# Patient Record
Sex: MALE | Race: BLACK OR AFRICAN AMERICAN
[De-identification: names, ages, dates, MRNs, and addresses within clinical notes are randomized per-mention and may not be internally consistent; named-entity substitution may affect disease eponyms.]

---

## 2017-11-10 ENCOUNTER — HOSPITAL ENCOUNTER (INPATIENT)
Dept: HOSPITAL 74 - YASAS | Age: 52
LOS: 4 days | Discharge: HOME | End: 2017-11-14
Attending: INTERNAL MEDICINE | Admitting: INTERNAL MEDICINE
Payer: COMMERCIAL

## 2017-11-10 VITALS — BODY MASS INDEX: 37.8 KG/M2

## 2017-11-10 DIAGNOSIS — F10.230: Primary | ICD-10-CM

## 2017-11-10 DIAGNOSIS — D17.0: ICD-10-CM

## 2017-11-10 DIAGNOSIS — I10: ICD-10-CM

## 2017-11-10 DIAGNOSIS — F14.20: ICD-10-CM

## 2017-11-10 LAB
APPEARANCE UR: CLEAR
BILIRUB UR STRIP.AUTO-MCNC: NEGATIVE MG/DL
COLOR UR: (no result)
KETONES UR QL STRIP: NEGATIVE
NITRITE UR QL STRIP: NEGATIVE
PH UR: 5 [PH] (ref 5–8)
PROT UR QL STRIP: NEGATIVE
PROT UR QL STRIP: NEGATIVE
RBC # UR STRIP: NEGATIVE /UL
SP GR UR: 1.02 (ref 1–1.03)
UROBILINOGEN UR STRIP-MCNC: NEGATIVE MG/DL (ref 0.2–1)

## 2017-11-10 PROCEDURE — HZ2ZZZZ DETOXIFICATION SERVICES FOR SUBSTANCE ABUSE TREATMENT: ICD-10-PCS | Performed by: INTERNAL MEDICINE

## 2017-11-10 RX ADMIN — Medication SCH: at 22:56

## 2017-11-10 RX ADMIN — TOLNAFTATE SCH APPLIC: 10 CREAM TOPICAL at 15:32

## 2017-11-10 RX ADMIN — TOLNAFTATE SCH: 10 CREAM TOPICAL at 22:56

## 2017-11-10 RX ADMIN — VALSARTAN SCH MG: 40 TABLET, FILM COATED ORAL at 15:40

## 2017-11-10 NOTE — HP
CIWA Score





- CIWA Score


Nausea/Vomitin-No Nausea/No Vomiting


Muscle Tremors: 4-Moderate,w/Arms Extend


Anxiety: 3


Agitation: 3


Paroxysmal Sweats: No Perspiration


Orientation: 0-Oriented


Tacttile Disturbances: 2-Mild Itch/Numbness/Burn


Auditory Disturbances: 0-None


Visual Disturbances: 3-Moderate Sensitivity


Headache: 0-None Present


CIWA-Ar Total Score: 15





Admission ROS BHS





- HPI


Chief Complaint: 





"I'm here for help and to get off of Alcohol and Crack Cocaine."


Pt. is here to Detox from Alcohol.


Allergies/Adverse Reactions: 


 Allergies











Allergy/AdvReac Type Severity Reaction Status Date / Time


 


No Known Allergies Allergy   Verified 11/10/17 12:32











History of Present Illness: 





Pt. is a 51 YO male here to Detox from Alcohol. Pt. has had several previous 

Detox admissions at [facility] (last: 10/2013). Longest Period of Sobriety in past: 5 

years (- ). 


Exam Limitations: No Limitations





- Ebola screening


Have you traveled outside of the country in the last 21 days: No (N)


Have you had contact with anyone from an Ebola affected area: No


Have you been sick,other than usual withdrawal symptoms: No


Do you have a fever: No





- Review of Systems


Constitutional: Malaise, Changes in sleep, Unexplained wgt Loss (Lost approx. 

10 lbs. over last 2 months.)


EENT: reports: Blurred Vision


Respiratory: reports: No Symptoms reported


Cardiac: reports: No Symptoms Reported


GI: reports: Indigestion


: reports: No Symptoms Reported


Musculoskeletal: reports: No Symptoms Reported


Integumentary: reports: No Symptoms Reported


Neuro: reports: Tremors


Endocrine: reports: No Symptoms Reported


Hematology: reports: No Symptoms Reported


Psychiatric: reports: Judgement Intact, Mood/Affect Appropiate, Orientated x3, 

Anxious


Other Systems: Reviewed and Negative





Patient History





- Patient Medical History


Hx Anemia: No


Hx Asthma: No


Hx Chronic Obstructive Pulmonary Disease (COPD): No


Hx Cancer: No


Hx Cardiac Disorders: No


Hx Congestive Heart Failure: No


Hx Hypertension: Yes


Hx Hypercholesterolemia: No


Hx Pacemaker: No


HX Cerebrovascular Accident: No


Hx Seizures: No


Hx Dementia: No


Hx Diabetes: No


Hx Gastrointestinal Disorders: No


Hx Liver Disease: No


Hx Genitourinary Disorders: No


Hx Sexually Transmitted Disorders: No


Hx Renal Disease (ESRD): No


Hx Thyroid Disease: No


Hx Human Immunodeficiency Virus (HIV): No


Hx Hepatitis C: No (Last tested: 10/2017: NEGATIVE.)


Hx Depression: No


Hx Suicide Attempt: No (PATIENT DENIES CURRENT SI / HI.)


Hx Schizophrenia: No


Other Medical History: Benign superficial growth on posterior head. Plans to 

have removed.





- Patient Surgical History


Past Surgical History: No


Hx Neurologic Surgery: No


Hx Cataract Extraction: No


Hx Cardiac Surgery: No


Hx Lung Surgery: No


Hx Breast Surgery: No


Hx Breast Biopsy: No


Hx Abdominal Surgery: No


Hx Appendectomy: No


Hx Cholecystectomy: No


Hx Genitourinary Surgery: No


Hx  Section: No


Hx Orthopedic Surgery: No


Other Surgical History: SWELLING ON BACK OF THE HEAD SINCE HE WAS 21 YRS. OLD


Anesthesia Reaction: No





- PPD History


Previous Implant?: Yes


Documented Results: Negative w/o proof


Implanted On Prior [facility] Admission?: Yes


Date: 10/13/13


Results: 0 mm


PPD to be Administered?: Yes





- Reproductive History


Patient is a Female of Child Bearing Age (11 -55 yrs old): No (PATIENT IS MALE.)





- Smoking Cessation


Smoking history: Former smoker


Have you smoked in the past 12 months: Yes


Aproximately how many cigarettes per day: 1


If you are a former smoker, when did you quit?: Approx. 2 months ago.


Cigars Per Day: 0


Hx Chewing Tobacco Use: No


Initiated information on smoking cessation: Yes


'Breaking Loose' booklet given: 11/10/17 (GIVEN ON UNIT.)





- Substance & Tx. History


Hx Alcohol Use: Yes


Hx Substance Use: Yes


Substance Use Type: Alcohol, Cocaine


Hx Substance Use Treatment: Yes (Previous Detox admissions at [facility]; Most Recent

: 10/2013.)





- Substances Abused


  ** Crack


Route: Smoking


Frequency: 3-6 times per week


Amount used: $100


Age of first use: 31


Date of Last Use: 11/10/17





  ** Alcohol-beer


Route: Oral


Frequency: Daily


Amount used: 2-3 6 pks.


Age of first use: 18


Date of Last Use: 11/10/17





Family Disease History





- Family Disease History


Family History: Denies





Admission Physical Exam [facility]





- Vital Signs


Vital Signs: 


 Vital Signs - 24 hr











  11/10/17





  11:49


 


Temperature 95.4 F L


 


Pulse Rate 89


 


Respiratory 18





Rate 


 


Blood Pressure 156/97














- Physical


General Appearance: Yes: No Apparent Distress, Nourished, Appropriately Dressed

, Tremorous


HEENTM: Yes: Hearing grossly Normal, Normocephalic, Normal Voice, LISY, Pharynx 

Normal


Respiratory: Yes: Chest Non-Tender, Lungs Clear, No Respiratory Distress, No 

Accessory Muscle Use


Neck: Yes: No masses,lesions,Nodules, Supple, Trachea in good position


Breast: Yes: Breast Exam Deferred


Cardiology: Yes: Regular Rhythm, Regular Rate, S1, S2


Abdominal: Yes: Normal Bowel Sounds, Non Tender, Soft, Protuberent


Genitourinary: Yes: Within Normal Limits


Back: Yes: Normal Inspection


Musculoskeletal: Yes: full range of Motion, Gait Steady


Extremities: Yes: Normal Range of Motion, Non-Tender, Tremors


Neurological: Yes: Fully Oriented, Alert, Normal Mood/Affect, Normal Response


Integumentary: Yes: Normal Color, Dry, Warm


Lymphatic: Yes: Within Normal Limits





- Diagnostic


(1) Alcohol dependence with uncomplicated withdrawal


Current Visit: Yes   Status: Acute   





(2) Cocaine dependence, uncomplicated


Current Visit: Yes   Status: Acute   





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 

and neck


Current Visit: Yes   Status: Chronic   





(4) Hypertension


Current Visit: Yes   Status: Chronic   


Qualifiers: 


   Hypertension type: essential hypertension   Qualified Code(s): I10 - 

Essential (primary) hypertension   





Cleared for Admission [facility]





- Detox or Rehab


[facility] Level of Care: Medically Managed


Detox Regimen/Protocol: Librium





BHS Breath Alcohol Content


Breath Alcohol Content: 0.008





Urine Drug Screen





- Results


Drug Screen Negative: No


Urine Drug Screen Results: FITZ-Cocaine

## 2017-11-11 LAB
ALBUMIN SERPL-MCNC: 3.8 G/DL (ref 3.4–5)
ALP SERPL-CCNC: 35 U/L (ref 45–117)
ALT SERPL-CCNC: 19 U/L (ref 12–78)
ANION GAP SERPL CALC-SCNC: 11 MMOL/L (ref 8–16)
AST SERPL-CCNC: 6 U/L (ref 15–37)
BILIRUB SERPL-MCNC: 0.5 MG/DL (ref 0.2–1)
CALCIUM SERPL-MCNC: 8.8 MG/DL (ref 8.5–10.1)
CO2 SERPL-SCNC: 23 MMOL/L (ref 21–32)
CREAT SERPL-MCNC: 1.3 MG/DL (ref 0.7–1.3)
DEPRECATED RDW RBC AUTO: 14.9 % (ref 11.9–15.9)
GLUCOSE SERPL-MCNC: 107 MG/DL (ref 74–106)
MCH RBC QN AUTO: 22.7 PG (ref 25.7–33.7)
MCHC RBC AUTO-ENTMCNC: 30.8 G/DL (ref 32–35.9)
MCV RBC: 73.7 FL (ref 80–96)
PLATELET # BLD AUTO: 179 K/MM3 (ref 134–434)
PMV BLD: 9.5 FL (ref 7.5–11.1)
PROT SERPL-MCNC: 7.3 G/DL (ref 6.4–8.2)
WBC # BLD AUTO: 4.3 K/MM3 (ref 4–10)

## 2017-11-11 RX ADMIN — ENALAPRIL MALEATE SCH MG: 10 TABLET ORAL at 12:31

## 2017-11-11 RX ADMIN — VALSARTAN SCH MG: 40 TABLET, FILM COATED ORAL at 12:30

## 2017-11-11 RX ADMIN — Medication SCH APPLIC: at 22:13

## 2017-11-11 RX ADMIN — Medication SCH APPLIC: at 10:31

## 2017-11-11 RX ADMIN — Medication SCH TAB: at 10:32

## 2017-11-11 RX ADMIN — TOLNAFTATE SCH APPLIC: 10 CREAM TOPICAL at 10:32

## 2017-11-11 RX ADMIN — Medication SCH MG: at 22:13

## 2017-11-11 RX ADMIN — HYDROXYZINE PAMOATE PRN MG: 50 CAPSULE ORAL at 22:14

## 2017-11-11 RX ADMIN — TOLNAFTATE SCH APPLIC: 10 CREAM TOPICAL at 22:13

## 2017-11-11 NOTE — PN
[facility] CIWA





- CIWA Score


Nausea/Vomitin-No Nausea/No Vomiting


Muscle Tremors: 5


Anxiety: 4-Mod. Anxious/Guarded


Agitation: 3


Paroxysmal Sweats: 3


Orientation: 0-Oriented


Tacttile Disturbances: 2-Mild Itch/Numbness/Burn


Auditory Disturbances: 0-None


Visual Disturbances: 1-Very Mild Sensitivity


Headache: 0-None Present


CIWA-Ar Total Score: 18





BHS Progress Note (SOAP)


Subjective: 





Tremors, Anxious, Sweating.


Objective: 


PT. A & O X 3, OBSERVED AMBULATING ON UNIT. NO ACUTE DISTRESS.


PT. DENIES CHEST PAIN.





17 15:59


 Vital Signs











Temperature  97.5 F L  17 13:09


 


Pulse Rate  98 H  17 13:09


 


Respiratory Rate  18   17 13:09


 


Blood Pressure  148/85   17 13:09


 


O2 Sat by Pulse Oximetry (%)      








 Laboratory Tests











  11/10/17 11/11/17 11/11/17





  19:00 06:20 06:20


 


WBC   4.3  D 


 


RBC   5.47  D 


 


Hgb   12.4  D 


 


Hct   40.3  D 


 


MCV   73.7 L 


 


MCH   22.7 L 


 


MCHC   30.8 L 


 


RDW   14.9 


 


Plt Count   179 


 


MPV   9.5  D 


 


Sodium    141


 


Potassium    4.2


 


Chloride    107


 


Carbon Dioxide    23


 


Anion Gap    11


 


BUN    18


 


Creatinine    1.3


 


Creat Clearance w eGFR    58.20


 


Random Glucose    107 H


 


Calcium    8.8


 


Total Bilirubin    0.5


 


AST    6 L D


 


ALT    19  D


 


Alkaline Phosphatase    35 L


 


Total Protein    7.3


 


Albumin    3.8


 


Urine Color  Ltyellow  


 


Urine Appearance  Clear  


 


Urine pH  5.0  


 


Ur Specific Gravity  1.021  


 


Urine Protein  Negative  


 


Urine Glucose (UA)  Negative  


 


Urine Ketones  Negative  


 


Urine Blood  Negative  


 


Urine Nitrite  Negative  


 


Urine Bilirubin  Negative  


 


Urine Urobilinogen  Negative  


 


Ur Leukocyte Esterase  Negative  


 


RPR Titer   














  17





  06:20


 


WBC 


 


RBC 


 


Hgb 


 


Hct 


 


MCV 


 


MCH 


 


MCHC 


 


RDW 


 


Plt Count 


 


MPV 


 


Sodium 


 


Potassium 


 


Chloride 


 


Carbon Dioxide 


 


Anion Gap 


 


BUN 


 


Creatinine 


 


Creat Clearance w eGFR 


 


Random Glucose 


 


Calcium 


 


Total Bilirubin 


 


AST 


 


ALT 


 


Alkaline Phosphatase 


 


Total Protein 


 


Albumin 


 


Urine Color 


 


Urine Appearance 


 


Urine pH 


 


Ur Specific Gravity 


 


Urine Protein 


 


Urine Glucose (UA) 


 


Urine Ketones 


 


Urine Blood 


 


Urine Nitrite 


 


Urine Bilirubin 


 


Urine Urobilinogen 


 


Ur Leukocyte Esterase 


 


RPR Titer  Nonreactive








LABS NOTED.


Assessment: 





17 15:59


WITHDRAWAL SYMPTOMS.


Plan: 





CONTINUE DETOX.


ENALAPRIL, 20 MG PO DAILY FOR ELEVATED BP (PATIENT REPORTS THAT HE TOOK THIS 

MEDICATION IN RECENT PAST PRIOR TO ADMISSION TO DETOX).

## 2017-11-11 NOTE — EKG
Test Reason : 

Blood Pressure : ***/*** mmHG

Vent. Rate : 087 BPM     Atrial Rate : 087 BPM

   P-R Int : 158 ms          QRS Dur : 084 ms

    QT Int : 342 ms       P-R-T Axes : 069 -18 -17 degrees

   QTc Int : 411 ms

 

NORMAL SINUS RHYTHM

NONSPECIFIC T WAVE ABNORMALITY

ABNORMAL ECG

NO PREVIOUS ECGS AVAILABLE

Confirmed by JOHN DONALDSON MD ([id number]) on 11/11/2017 4:39:21 PM

 

Referred By:             Confirmed By:JOHN DONALDSON MD

## 2017-11-12 RX ADMIN — Medication SCH: at 23:25

## 2017-11-12 RX ADMIN — Medication SCH TAB: at 10:30

## 2017-11-12 RX ADMIN — Medication SCH MG: at 22:28

## 2017-11-12 RX ADMIN — Medication SCH APPLIC: at 10:30

## 2017-11-12 RX ADMIN — VALSARTAN SCH MG: 40 TABLET, FILM COATED ORAL at 10:30

## 2017-11-12 RX ADMIN — TOLNAFTATE SCH APPLIC: 10 CREAM TOPICAL at 10:30

## 2017-11-12 RX ADMIN — TOLNAFTATE SCH: 10 CREAM TOPICAL at 23:25

## 2017-11-12 RX ADMIN — ENALAPRIL MALEATE SCH MG: 10 TABLET ORAL at 10:30

## 2017-11-12 RX ADMIN — HYDROXYZINE PAMOATE PRN MG: 50 CAPSULE ORAL at 22:29

## 2017-11-12 NOTE — PN
S CIWA





- CIWA Score


Nausea/Vomiting: 3


Muscle Tremors: 4-Moderate,w/Arms Extend


Anxiety: 4-Mod. Anxious/Guarded


Agitation: 2


Paroxysmal Sweats: 3


Orientation: 0-Oriented


Tacttile Disturbances: 1-Very Mild Itch/Numbness


Auditory Disturbances: 0-None


Visual Disturbances: 0-None


Headache: 1-Very Mild


CIWA-Ar Total Score: 18





BHS Progress Note (SOAP)


Subjective: 





Anxious, sweating, tremor, nausea


Objective: 





11/12/17 13:23


 Last Vital Signs











Temp Pulse Resp BP Pulse Ox


 


 97.5 F L  101 H  18   142/90    


 


 11/12/17 10:25  11/12/17 10:25  11/12/17 10:25  11/12/17 10:25   








 Laboratory Tests











  11/10/17 11/11/17 11/11/17





  19:00 06:20 06:20


 


WBC   4.3  D 


 


RBC   5.47  D 


 


Hgb   12.4  D 


 


Hct   40.3  D 


 


MCV   73.7 L 


 


MCH   22.7 L 


 


MCHC   30.8 L 


 


RDW   14.9 


 


Plt Count   179 


 


MPV   9.5  D 


 


Sodium    141


 


Potassium    4.2


 


Chloride    107


 


Carbon Dioxide    23


 


Anion Gap    11


 


BUN    18


 


Creatinine    1.3


 


Creat Clearance w eGFR    58.20


 


Random Glucose    107 H


 


Calcium    8.8


 


Total Bilirubin    0.5


 


AST    6 L D


 


ALT    19  D


 


Alkaline Phosphatase    35 L


 


Total Protein    7.3


 


Albumin    3.8


 


Urine Color  Ltyellow  


 


Urine Appearance  Clear  


 


Urine pH  5.0  


 


Ur Specific Gravity  1.021  


 


Urine Protein  Negative  


 


Urine Glucose (UA)  Negative  


 


Urine Ketones  Negative  


 


Urine Blood  Negative  


 


Urine Nitrite  Negative  


 


Urine Bilirubin  Negative  


 


Urine Urobilinogen  Negative  


 


Ur Leukocyte Esterase  Negative  


 


RPR Titer   














  11/11/17





  06:20


 


WBC 


 


RBC 


 


Hgb 


 


Hct 


 


MCV 


 


MCH 


 


MCHC 


 


RDW 


 


Plt Count 


 


MPV 


 


Sodium 


 


Potassium 


 


Chloride 


 


Carbon Dioxide 


 


Anion Gap 


 


BUN 


 


Creatinine 


 


Creat Clearance w eGFR 


 


Random Glucose 


 


Calcium 


 


Total Bilirubin 


 


AST 


 


ALT 


 


Alkaline Phosphatase 


 


Total Protein 


 


Albumin 


 


Urine Color 


 


Urine Appearance 


 


Urine pH 


 


Ur Specific Gravity 


 


Urine Protein 


 


Urine Glucose (UA) 


 


Urine Ketones 


 


Urine Blood 


 


Urine Nitrite 


 


Urine Bilirubin 


 


Urine Urobilinogen 


 


Ur Leukocyte Esterase 


 


RPR Titer  Nonreactive








Labs noted


Assessment: 





11/12/17 13:23


Withdrawal symptoms


Plan: 





Continue detox


Encouraged to drink lots of water

## 2017-11-13 RX ADMIN — Medication SCH: at 23:35

## 2017-11-13 RX ADMIN — TOLNAFTATE SCH APPLIC: 10 CREAM TOPICAL at 10:24

## 2017-11-13 RX ADMIN — TOLNAFTATE SCH: 10 CREAM TOPICAL at 23:35

## 2017-11-13 RX ADMIN — ENALAPRIL MALEATE SCH: 10 TABLET ORAL at 23:35

## 2017-11-13 RX ADMIN — Medication SCH TAB: at 10:24

## 2017-11-13 RX ADMIN — METOPROLOL TARTRATE SCH MG: 50 TABLET, FILM COATED ORAL at 10:28

## 2017-11-13 RX ADMIN — ENALAPRIL MALEATE SCH MG: 10 TABLET ORAL at 10:27

## 2017-11-13 RX ADMIN — HYDROCHLOROTHIAZIDE SCH MG: 25 TABLET ORAL at 10:27

## 2017-11-13 RX ADMIN — Medication SCH: at 10:25

## 2017-11-13 RX ADMIN — METOPROLOL TARTRATE SCH: 50 TABLET, FILM COATED ORAL at 23:35

## 2017-11-13 NOTE — PN
BHS Progress Note (SOAP)


Subjective: 





IRRITABILITY,AGITATIONS,SWEATS. PT ANXIOUS ABOUT AFTERCARE. ENCOURAGED PATIENT 

TO FOLLOW UP WITH HIS COUNSELOR ROME TODAY  TO FINALIZE AFTERCARE PLANS.


PT HAD AN EXTREME ELEVATION OFBP THIS MORNING WHILE EXHIBITING WORRIES OVER 

TOMORROW'S DISCHARGE DAY. PT IS ON ENALAPRIL 20 MG PO BID, HYDROCHLOROTHIAZIDE 

25 MG DAILY AND METOPROLOL TAR 50 MG PO BID. D/C'D PREVIOUS ADMISSION MED 

DIOVAN AND RESTARTED PT'S HOME MEDS. 


Objective: 





11/13/17 10:39


 Vital Signs











Temperature  96.6 F L  11/13/17 09:07


 


Pulse Rate  109 H  11/13/17 09:07


 


Respiratory Rate  20   11/13/17 09:07


 


Blood Pressure  180/109   11/13/17 09:07


 


O2 Sat by Pulse Oximetry (%)      








 Laboratory Last Values











WBC  4.3 K/mm3 (4.0-10.0)  D 11/11/17  06:20    


 


RBC  5.47 M/mm3 (4.00-5.60)  D 11/11/17  06:20    


 


Hgb  12.4 GM/dL (11.7-16.9)  D 11/11/17  06:20    


 


Hct  40.3 % (35.4-49)  D 11/11/17  06:20    


 


MCV  73.7 fl (80-96)  L  11/11/17  06:20    


 


MCH  22.7 pg (25.7-33.7)  L  11/11/17  06:20    


 


MCHC  30.8 g/dl (32.0-35.9)  L  11/11/17  06:20    


 


RDW  14.9 % (11.9-15.9)   11/11/17  06:20    


 


Plt Count  179 K/MM3 (134-434)   11/11/17  06:20    


 


MPV  9.5 fl (7.5-11.1)  D 11/11/17  06:20    


 


Sodium  141 mmol/L (136-145)   11/11/17  06:20    


 


Potassium  4.2 mmol/L (3.5-5.1)   11/11/17  06:20    


 


Chloride  107 mmol/L ()   11/11/17  06:20    


 


Carbon Dioxide  23 mmol/L (21-32)   11/11/17  06:20    


 


Anion Gap  11  (8-16)   11/11/17  06:20    


 


BUN  18 mg/dL (7-18)   11/11/17  06:20    


 


Creatinine  1.3 mg/dL (0.7-1.3)   11/11/17  06:20    


 


Creat Clearance w eGFR  58.20  (>60)   11/11/17  06:20    


 


Random Glucose  107 mg/dL ()  H  11/11/17  06:20    


 


Calcium  8.8 mg/dL (8.5-10.1)   11/11/17  06:20    


 


Total Bilirubin  0.5 mg/dL (0.2-1.0)   11/11/17  06:20    


 


AST  6 U/L (15-37)  L D 11/11/17  06:20    


 


ALT  19 U/L (12-78)  D 11/11/17  06:20    


 


Alkaline Phosphatase  35 U/L ()  L  11/11/17  06:20    


 


Total Protein  7.3 g/dl (6.4-8.2)   11/11/17  06:20    


 


Albumin  3.8 g/dl (3.4-5.0)   11/11/17  06:20    


 


Urine Color  Ltyellow   11/10/17  19:00    


 


Urine Appearance  Clear   11/10/17  19:00    


 


Urine pH  5.0  (5.0-8.0)   11/10/17  19:00    


 


Ur Specific Gravity  1.021  (1.001-1.035)   11/10/17  19:00    


 


Urine Protein  Negative  (NEGATIVE)   11/10/17  19:00    


 


Urine Glucose (UA)  Negative  (NEGATIVE)   11/10/17  19:00    


 


Urine Ketones  Negative  (NEGATIVE)   11/10/17  19:00    


 


Urine Blood  Negative  (NEGATIVE)   11/10/17  19:00    


 


Urine Nitrite  Negative  (NEGATIVE)   11/10/17  19:00    


 


Urine Bilirubin  Negative  (NEGATIVE)   11/10/17  19:00    


 


Urine Urobilinogen  Negative mg/dL (0.2-1.0)   11/10/17  19:00    


 


Ur Leukocyte Esterase  Negative  (NEGATIVE)   11/10/17  19:00    


 


RPR Titer  Nonreactive  (NONREACTIVE)   11/11/17  06:20    











Assessment: 





11/13/17 10:39


WITHDRAWAL SX


Plan: 





CONTINUE DETOX

## 2017-11-14 VITALS — DIASTOLIC BLOOD PRESSURE: 75 MMHG | TEMPERATURE: 98.4 F | SYSTOLIC BLOOD PRESSURE: 118 MMHG

## 2017-11-14 VITALS — HEART RATE: 86 BPM

## 2017-11-14 RX ADMIN — TOLNAFTATE SCH APPLIC: 10 CREAM TOPICAL at 10:30

## 2017-11-14 RX ADMIN — HYDROCHLOROTHIAZIDE SCH MG: 25 TABLET ORAL at 10:30

## 2017-11-14 RX ADMIN — Medication SCH: at 10:31

## 2017-11-14 RX ADMIN — ENALAPRIL MALEATE SCH MG: 10 TABLET ORAL at 10:30

## 2017-11-14 RX ADMIN — METOPROLOL TARTRATE SCH MG: 50 TABLET, FILM COATED ORAL at 10:30

## 2017-11-14 RX ADMIN — Medication SCH TAB: at 10:30

## 2017-11-14 NOTE — DS
[facility] Detox Discharge Summary


Admission Date: 


11/10/17





Discharge Date: 11/14/17





- History


Present History: Alcohol Dependence, Cocaine Dependence


Additional Comments: 





DETOX COMPLETED. ALERT O X 3. NAD. PT INSTRUCTED TO FOLLOW UP WITH PMD AT 

[facility] FOR MEDICAL MANAGEMENT OF COMORBID CONDITIONS. THIRTY DAYS RX  

SENT TO [facility] TO BE PICKED UP BY PATIENT FOR  ENALAPRIL,

HYDROCHLOROTHIAZIDE AND METOPROLOL.


Pertinent Past History: 





HTN


BENIGN LIPOMATOUS NEOPLASM OF SKIN, HEAD








- Physical Exam Results


Vital Signs: 


 Vital Signs











Temperature  98.4 F   11/14/17 09:13


 


Pulse Rate  86   11/14/17 09:13


 


Respiratory Rate  18   11/14/17 09:13


 


Blood Pressure  118/75   11/14/17 09:13


 


O2 Sat by Pulse Oximetry (%)      











Pertinent Admission Physical Exam Findings: 





WITHDRAWAL SX


 Laboratory Last Values











WBC  4.3 K/mm3 (4.0-10.0)  D 11/11/17  06:20    


 


RBC  5.47 M/mm3 (4.00-5.60)  D 11/11/17  06:20    


 


Hgb  12.4 GM/dL (11.7-16.9)  D 11/11/17  06:20    


 


Hct  40.3 % (35.4-49)  D 11/11/17  06:20    


 


MCV  73.7 fl (80-96)  L  11/11/17  06:20    


 


MCH  22.7 pg (25.7-33.7)  L  11/11/17  06:20    


 


MCHC  30.8 g/dl (32.0-35.9)  L  11/11/17  06:20    


 


RDW  14.9 % (11.9-15.9)   11/11/17  06:20    


 


Plt Count  179 K/MM3 (134-434)   11/11/17  06:20    


 


MPV  9.5 fl (7.5-11.1)  D 11/11/17  06:20    


 


Sodium  141 mmol/L (136-145)   11/11/17  06:20    


 


Potassium  4.2 mmol/L (3.5-5.1)   11/11/17  06:20    


 


Chloride  107 mmol/L ()   11/11/17  06:20    


 


Carbon Dioxide  23 mmol/L (21-32)   11/11/17  06:20    


 


Anion Gap  11  (8-16)   11/11/17  06:20    


 


BUN  18 mg/dL (7-18)   11/11/17  06:20    


 


Creatinine  1.3 mg/dL (0.7-1.3)   11/11/17  06:20    


 


Creat Clearance w eGFR  58.20  (>60)   11/11/17  06:20    


 


Random Glucose  107 mg/dL ()  H  11/11/17  06:20    


 


Calcium  8.8 mg/dL (8.5-10.1)   11/11/17  06:20    


 


Total Bilirubin  0.5 mg/dL (0.2-1.0)   11/11/17  06:20    


 


AST  6 U/L (15-37)  L D 11/11/17  06:20    


 


ALT  19 U/L (12-78)  D 11/11/17  06:20    


 


Alkaline Phosphatase  35 U/L ()  L  11/11/17  06:20    


 


Total Protein  7.3 g/dl (6.4-8.2)   11/11/17  06:20    


 


Albumin  3.8 g/dl (3.4-5.0)   11/11/17  06:20    


 


Urine Color  Ltyellow   11/10/17  19:00    


 


Urine Appearance  Clear   11/10/17  19:00    


 


Urine pH  5.0  (5.0-8.0)   11/10/17  19:00    


 


Ur Specific Gravity  1.021  (1.001-1.035)   11/10/17  19:00    


 


Urine Protein  Negative  (NEGATIVE)   11/10/17  19:00    


 


Urine Glucose (UA)  Negative  (NEGATIVE)   11/10/17  19:00    


 


Urine Ketones  Negative  (NEGATIVE)   11/10/17  19:00    


 


Urine Blood  Negative  (NEGATIVE)   11/10/17  19:00    


 


Urine Nitrite  Negative  (NEGATIVE)   11/10/17  19:00    


 


Urine Bilirubin  Negative  (NEGATIVE)   11/10/17  19:00    


 


Urine Urobilinogen  Negative mg/dL (0.2-1.0)   11/10/17  19:00    


 


Ur Leukocyte Esterase  Negative  (NEGATIVE)   11/10/17  19:00    


 


RPR Titer  Nonreactive  (NONREACTIVE)   11/11/17  06:20    














- Treatment


Hospital Course: Detox Protocol Followed, Detoxed Safely, Responded well, 

Discharged Condition Good, Rehab Referral Accepted


Patient has Accepted a Rehab Referral to: [facility]





- Medication


Discharge Medications: 


Ambulatory Orders





Enalapril Maleate [Vasotec] 20 mg PO BID #60 tablet 11/14/17 


Hydrochlorothiazide 25 mg PO DAILY #30 tablet 11/14/17 


Metoprolol Tartrate 50 mg PO BID #60 tablet 11/14/17 











- Diagnosis


(1) Alcohol dependence with uncomplicated withdrawal


Current Visit: Yes   Status: Acute   





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 

and neck


Current Visit: Yes   Status: Chronic   





(3) Cocaine dependence, uncomplicated


Current Visit: Yes   Status: Acute   





(4) Hypertension


Current Visit: Yes   Status: Chronic   


Qualifiers: 


   Hypertension type: essential hypertension   Qualified Code(s): I10 - 

Essential (primary) hypertension   





- AMA


Did Patient Leave Against Medical Advice: No

## 2018-06-11 ENCOUNTER — HOSPITAL ENCOUNTER (INPATIENT)
Dept: HOSPITAL 74 - YASAS | Age: 53
LOS: 4 days | Discharge: HOME | End: 2018-06-15
Attending: SURGERY | Admitting: SURGERY
Payer: COMMERCIAL

## 2018-06-11 VITALS — BODY MASS INDEX: 35.6 KG/M2

## 2018-06-11 DIAGNOSIS — Z87.891: ICD-10-CM

## 2018-06-11 DIAGNOSIS — G47.00: ICD-10-CM

## 2018-06-11 DIAGNOSIS — F10.230: Primary | ICD-10-CM

## 2018-06-11 DIAGNOSIS — E78.00: ICD-10-CM

## 2018-06-11 DIAGNOSIS — F14.20: ICD-10-CM

## 2018-06-11 DIAGNOSIS — D17.0: ICD-10-CM

## 2018-06-11 DIAGNOSIS — I10: ICD-10-CM

## 2018-06-11 DIAGNOSIS — F17.213: ICD-10-CM

## 2018-06-11 LAB
APPEARANCE UR: CLEAR
BACTERIA #/AREA URNS HPF: (no result) /HPF
BILIRUB UR STRIP.AUTO-MCNC: NEGATIVE MG/DL
COLOR UR: YELLOW
EPITH CASTS URNS QL MICRO: (no result) /HPF
HYALINE CASTS URNS QL MICRO: 11 /LPF
KETONES UR QL STRIP: NEGATIVE
LEUKOCYTE ESTERASE UR QL STRIP.AUTO: NEGATIVE
MUCOUS THREADS URNS QL MICRO: (no result)
NITRITE UR QL STRIP: NEGATIVE
PH UR: 5 [PH] (ref 5–8)
PROT UR QL STRIP: (no result)
PROT UR QL STRIP: NEGATIVE
RBC # UR STRIP: NEGATIVE /UL
SP GR UR: 1.03 (ref 1–1.03)
UROBILINOGEN UR STRIP-MCNC: NEGATIVE MG/DL (ref 0.2–1)

## 2018-06-11 PROCEDURE — HZ2ZZZZ DETOXIFICATION SERVICES FOR SUBSTANCE ABUSE TREATMENT: ICD-10-PCS | Performed by: SURGERY

## 2018-06-11 RX ADMIN — ATORVASTATIN CALCIUM SCH MG: 10 TABLET, FILM COATED ORAL at 22:17

## 2018-06-11 RX ADMIN — ZOLPIDEM TARTRATE PRN MG: 10 TABLET, FILM COATED ORAL at 22:17

## 2018-06-11 RX ADMIN — Medication SCH MG: at 22:17

## 2018-06-11 RX ADMIN — ENALAPRIL MALEATE SCH MG: 10 TABLET ORAL at 17:43

## 2018-06-11 RX ADMIN — METOPROLOL TARTRATE SCH MG: 50 TABLET, FILM COATED ORAL at 17:42

## 2018-06-11 NOTE — HP
CIWA Score





- CIWA Score


Nausea/Vomiting: 3


Muscle Tremors: 3


Anxiety: 3


Agitation: 3


Paroxysmal Sweats: 1-Minimal Palms Moist


Orientation: 0-Oriented


Tacttile Disturbances: 2-Mild Itch/Numbness/Burn


Auditory Disturbances: 2-Mild Harshness/Frighten


Visual Disturbances: 1-Very Mild Sensitivity


Headache: 2-Mild


CIWA-Ar Total Score: 20





Admission ROS [facility]





- HPI


Chief Complaint: 





i need help to stop drinking alcohol and cocaine


Allergies/Adverse Reactions: 


 Allergies











Allergy/AdvReac Type Severity Reaction Status Date / Time


 


No Known Allergies Allergy   Verified 18 14:13











History of Present Illness: 





this 53 years old male with alcohol and cocaine dependence,seeking detox,

withdrawal symptom,last detox [facility] 11/10/17 to 17


history of hypertension


insomnia


longest period of sobriety 5 years





- Ebola screening


Have you traveled outside of the country in the last 21 days: No (N)


Have you had contact with anyone from an Ebola affected area: No


Have you been sick,other than usual withdrawal symptoms: No


Do you have a fever: No





- Review of Systems


Constitutional: Chills, Loss of Appetite, Malaise, Night Sweats, Changes in 

sleep, Weakness


EENT: reports: Nose Congestion, Other (lipoma of occipital area)


Respiratory: reports: No Symptoms reported


Cardiac: reports: No Symptoms Reported


GI: reports: Diarrhea, Nausea, Vomiting, Abdominal cramping


: reports: No Symptoms Reported


Musculoskeletal: reports: Back Pain, Muscle Pain


Integumentary: reports: Dryness


Neuro: reports: Headache, Tremors


Endocrine: reports: No Symptoms Reported


Hematology: reports: No Symptoms Reported


Psychiatric: reports: No Sypmtoms Reported, Judgement Intact, Mood/Affect 

Appropiate, Orientated x3 (insomnia)





Patient History





- Patient Medical History


Hx Anemia: No


Hx Asthma: No


Hx Chronic Obstructive Pulmonary Disease (COPD): No


Hx Cancer: No


Hx Cardiac Disorders: No


Hx Congestive Heart Failure: No


Hx Hypertension: Yes (on med)


Hx Hypercholesterolemia: No


Hx Pacemaker: No


HX Cerebrovascular Accident: No


Hx Seizures: No


Hx Dementia: No


Hx Diabetes: No


Hx Gastrointestinal Disorders: No


Hx Liver Disease: No


Hx Genitourinary Disorders: No


Hx Sexually Transmitted Disorders: No


Hx Renal Disease (ESRD): No


Hx Thyroid Disease: No


Hx Human Immunodeficiency Virus (HIV): No (last  negative)


Hx Hepatitis C: No (Last tested: 10/2017: NEGATIVE.)


Hx Depression: No


Hx Suicide Attempt: No (PATIENT DENIES CURRENT SI / HI.)


Hx Bipolar Disorder: No


Hx Schizophrenia: No


Other Medical History: no suicidal,no homicidal





- Patient Surgical History


Past Surgical History: No


Hx Neurologic Surgery: No


Hx Cataract Extraction: No


Hx Cardiac Surgery: No


Hx Lung Surgery: No


Hx Breast Surgery: No


Hx Breast Biopsy: No


Hx Abdominal Surgery: No


Hx Appendectomy: No


Hx Cholecystectomy: No


Hx Genitourinary Surgery: No


Hx  Section: No


Hx Orthopedic Surgery: No


Other Surgical History: SWELLING ON BACK OF THE HEAD SINCE HE WAS 21 YRS. OLD


Anesthesia Reaction: No





- PPD History


Previous Implant?: Yes


Implanted On Prior [facility] Admission?: Yes


Date: 17


Results: 0 mm


PPD to be Administered?: No





- Smoking Cessation


Smoking history: Former smoker


Have you smoked in the past 12 months: Yes


Aproximately how many cigarettes per day: 1


If you are a former smoker, when did you quit?: Approx. 2 months ago.


Cigars Per Day: 0


Hx Chewing Tobacco Use: No


Initiated information on smoking cessation: Yes


'Breaking Loose' booklet given: 18





- Substance & Tx. History


Hx Alcohol Use: Yes


Hx Substance Use: Yes


Substance Use Type: Alcohol, Cocaine


Hx Substance Use Treatment: Yes ([facility] 11/10/17to 17)





- Substances Abused


  ** Alcohol


Route: Oral


Frequency: Daily


Amount used: 4-5 40 Oz BEERS


Age of first use: 18


Date of Last Use: 18





  ** Cocaine


Route: Smoking


Frequency: Daily


Amount used: $100 AND UP


Age of first use: 31


Date of Last Use: 18





Family Disease History





- Family Disease History


Family History: Denies





Admission Physical Exam [facility]





- Vital Signs


Vital Signs: 


 Vital Signs - 24 hr











  18





  13:56


 


Temperature 98.3 F


 


Pulse Rate 96 H


 


Respiratory 16





Rate 


 


Blood Pressure 156/96














- Physical


General Appearance: Yes: Moderate Distress, Tremorous, Irritable, Sweating, 

Anxious, Other (mass occipital area 4x4 cm)


HEENTM: Yes: Normal ENT Inspection, LISY, Pharynx Normal


Respiratory: Yes: Lungs Clear, Normal Breath Sounds, No Respiratory Distress


Neck: Yes: Within Normal Limits


Breast: Yes: Within Normal Limits


Cardiology: Yes: Within Normal Limits, Regular Rhythm, Regular Rate, S1, S2


Abdominal: Yes: Within Normal Limits, Normal Bowel Sounds, Non Tender, Flat, 

Soft


Genitourinary: Yes: Within Normal Limits


Back: Yes: Within Normal Limits


Musculoskeletal: Yes: Within Normal Limits, Back pain, Muscle Pain


Extremities: Yes: Tremors


Neurological: Yes: CNs II-XII NML intact, Alert, Motor Strength 5/5


Integumentary: Yes: Dry





- Diagnostic


(1) Alcohol dependence with uncomplicated withdrawal


Current Visit: No   Status: Acute   





(2) Cocaine dependence, uncomplicated


Current Visit: No   Status: Acute   





(3) Hypertension


Current Visit: No   Status: Chronic   


Qualifiers: 


   Hypertension type: essential hypertension   Qualified Code(s): I10 - 

Essential (primary) hypertension   





(4) Lipoma


Current Visit: Yes   Status: Acute   





Cleared for Admission S





- Detox or Rehab


[facility] Level of Care: Medically Managed


Detox Regimen/Protocol: Librium





BHS Breath Alcohol Content


Breath Alcohol Content: 0





Urine Drug Screen





- Results


Drug Screen Negative: No


Urine Drug Screen Results: FITZ-Cocaine

## 2018-06-11 NOTE — CONSULT
[facility]HS Psychiatric Consult





- Data


Date of interview: 06/11/18


Admission source: [facility]


Identifying data: Readmission to [facility] for this 52 y/o AA male seeking 

detox treatment on [address] for alcohol and cocaine (crack) dependence.Patient 

is ,a father of four,domiciled and employed.


Substance Abuse History: Confirmed by the patient in my interview.Smoking 

history: Former smoker.  Have you smoked in the past 12 months: Yes.  

Aproximately how many cigarettes per day: 1.  If you are a former smoker, when 

did you quit?: Approx. 2 months ago.  Cigars Per Day: 0.  Hx Chewing Tobacco Use

: No.  Initiated information on smoking cessation: Yes.  'Breaking Loose' 

booklet given: 06/11/18.  - Substance & Tx. History.  Hx Alcohol Use: Yes.  Hx 

Substance Use: Yes.  Substance Use Type: Alcohol, Cocaine.  Hx Substance Use 

Treatment: Yes ([facility] 11/10/17to 11/14/17).  - Substances Abused.  ** Alcohol.  

Route: Oral.  Frequency: Daily.  Amount used: 4-5 40 Oz BEERS.  Age of first use

: 18.  Date of Last Use: 06/11/18.  ** Cocaine.  Route: Smoking.  Frequency: 

Daily.  Amount used: $100 AND UP.  Age of first use: 31.  Date of Last Use: 06/ 11/18


Medical History: Hypertension and lipoma (occipital region).No known allergies.


Psychiatric History: Patient denies.


Physical/Sexual Abuse/Trauma History: Patient denies.


Additional Comment: Urine Drug Screen Results: FITZ-Cocaine.Noted.





Mental Status Exam





- Mental Status Exam


Alert and Oriented to: Time, Place, Person


Cognitive Function: Good


Patient Appearance: Well Groomed (presence of a soft mass protruding in the 

back of the head : occipital area ; steady growth for several years)


Mood: Hopeful, Euthymic


Affect: Appropriate, Normal Range


Patient Behavior: Fatigued, Appropriate, Cooperative


Speech Pattern: Clear, Appropriate


Voice Loudness: Normal


Thought Process: Intact, Goal Oriented


Thought Disorder: Not Present


Hallucinations: Denies


Suicidal Ideation: Denies


Homicidal Ideation: Denies


Insight/Judgement: Fair


Sleep: Poorly, Difficulty falling asleep


Appetite: Good


Muscle strength/Tone: Normal


Gait/Station: Normal





Psychiatric Findings





- Problem List (Axis 1, 2,3)


(1) Alcohol dependence with uncomplicated withdrawal


Current Visit: Yes   Status: Acute   





(2) Cocaine dependence, uncomplicated


Current Visit: Yes   Status: Acute   





(3) Nicotine dependence


Current Visit: Yes   Status: Acute   





(4) Insomnia


Current Visit: Yes   Status: Acute   





- Initial Treatment Plan


Initial Treatment Plan: Psychoeducation.Sleep hygiene.Detoxification in 

progress.Ambien 10 mg po hs prn.Patient is made aware of the risk for 

parasomnias.Mr Tom agrees to this careplan.Observation.

## 2018-06-12 LAB
ALBUMIN SERPL-MCNC: 3.8 G/DL (ref 3.4–5)
ALP SERPL-CCNC: 38 U/L (ref 45–117)
ALT SERPL-CCNC: 27 U/L (ref 12–78)
ANION GAP SERPL CALC-SCNC: 10 MMOL/L (ref 8–16)
AST SERPL-CCNC: 14 U/L (ref 15–37)
BILIRUB SERPL-MCNC: 0.3 MG/DL (ref 0.2–1)
BUN SERPL-MCNC: 23 MG/DL (ref 7–18)
CALCIUM SERPL-MCNC: 8.5 MG/DL (ref 8.5–10.1)
CHLORIDE SERPL-SCNC: 106 MMOL/L (ref 98–107)
CO2 SERPL-SCNC: 24 MMOL/L (ref 21–32)
CREAT SERPL-MCNC: 1.2 MG/DL (ref 0.7–1.3)
DEPRECATED RDW RBC AUTO: 14.7 % (ref 11.9–15.9)
GLUCOSE SERPL-MCNC: 127 MG/DL (ref 74–106)
HCT VFR BLD CALC: 40.3 % (ref 35.4–49)
HGB BLD-MCNC: 12.9 GM/DL (ref 11.7–16.9)
MCH RBC QN AUTO: 24 PG (ref 25.7–33.7)
MCHC RBC AUTO-ENTMCNC: 32.1 G/DL (ref 32–35.9)
MCV RBC: 74.8 FL (ref 80–96)
PLATELET # BLD AUTO: 173 K/MM3 (ref 134–434)
PMV BLD: 10.4 FL (ref 7.5–11.1)
POTASSIUM SERPLBLD-SCNC: 4.2 MMOL/L (ref 3.5–5.1)
PROT SERPL-MCNC: 7.6 G/DL (ref 6.4–8.2)
RBC # BLD AUTO: 5.38 M/MM3 (ref 4–5.6)
SODIUM SERPL-SCNC: 140 MMOL/L (ref 136–145)
WBC # BLD AUTO: 4.3 K/MM3 (ref 4–10)

## 2018-06-12 RX ADMIN — Medication SCH MG: at 22:08

## 2018-06-12 RX ADMIN — METOPROLOL TARTRATE SCH MG: 50 TABLET, FILM COATED ORAL at 17:30

## 2018-06-12 RX ADMIN — Medication SCH TAB: at 10:42

## 2018-06-12 RX ADMIN — ENALAPRIL MALEATE SCH MG: 10 TABLET ORAL at 17:31

## 2018-06-12 RX ADMIN — METOPROLOL TARTRATE SCH MG: 50 TABLET, FILM COATED ORAL at 10:42

## 2018-06-12 RX ADMIN — ENALAPRIL MALEATE SCH MG: 10 TABLET ORAL at 10:42

## 2018-06-12 RX ADMIN — ATORVASTATIN CALCIUM SCH MG: 10 TABLET, FILM COATED ORAL at 22:09

## 2018-06-12 RX ADMIN — ZOLPIDEM TARTRATE PRN MG: 10 TABLET, FILM COATED ORAL at 22:09

## 2018-06-12 RX ADMIN — HYDROCHLOROTHIAZIDE SCH MG: 25 TABLET ORAL at 10:42

## 2018-06-12 NOTE — PN
S CIWA





- CIWA Score


Nausea/Vomitin-No Nausea/No Vomiting


Muscle Tremors: 4-Moderate,w/Arms Extend


Anxiety: 4-Mod. Anxious/Guarded


Agitation: 4-Moderately Restless


Paroxysmal Sweats: 1-Minimal Palms Moist


Orientation: 0-Oriented


Tacttile Disturbances: 0-None


Auditory Disturbances: 0-None


Visual Disturbances: 0-None


Headache: 0-None Present


CIWA-Ar Total Score: 13





BHS Progress Note (SOAP)


Subjective: 





ANXIETY,SWEATS. ALERT O X 3. OOB AMBULATING WITH STEADY GAIT.


Objective: 





18 10:16


 Vital Signs











  18





  03:30 06:18 09:20


 


Temperature  97.4 F L 97.4 F L


 


Pulse Rate  77 82


 


Respiratory 18 20 20





Rate   


 


Blood Pressure  144/99 158/105














  18





  09:26


 


Temperature 


 


Pulse Rate 


 


Respiratory 





Rate 


 


Blood Pressure 161/100








 Laboratory Tests











  18





  17:45


 


Urine Color  Yellow


 


Urine Appearance  Clear


 


Urine pH  5.0


 


Ur Specific Gravity  1.029


 


Urine Protein  1+ H


 


Urine Glucose (UA)  Negative


 


Urine Ketones  Negative


 


Urine Blood  Negative


 


Urine Nitrite  Negative


 


Urine Bilirubin  Negative


 


Urine Urobilinogen  Negative


 


Ur Leukocyte Esterase  Negative


 


Urine WBC (Auto)  11


 


Urine RBC (Auto)  5


 


Ur Epithelial Cells  Few


 


Urine Bacteria  Rare


 


Hyaline Casts  11


 


Urine Mucus  Many











Assessment: 





18 10:17


WITHDRAWAL SX


HX HTN-ON MEDS


Plan: 





CONTINUE DETOX


INCREASE PO FLUIDS.

## 2018-06-12 NOTE — EKG
Test Reason : 

Blood Pressure : ***/*** mmHG

Vent. Rate : 082 BPM     Atrial Rate : 082 BPM

   P-R Int : 158 ms          QRS Dur : 094 ms

    QT Int : 362 ms       P-R-T Axes : 062 -21 -05 degrees

   QTc Int : 422 ms

 

NORMAL SINUS RHYTHM

POSSIBLE LEFT ATRIAL ENLARGEMENT

BORDERLINE ECG

WHEN COMPARED WITH ECG OF 10-NOV-2017 16:12,

NO SIGNIFICANT CHANGE WAS FOUND

Confirmed by MD JEANTETE, STACEY ([id number]) on 6/12/2018 12:25:05 PM

 

Referred By:             Confirmed By:STACEY REID MD

## 2018-06-13 RX ADMIN — ENALAPRIL MALEATE SCH MG: 10 TABLET ORAL at 18:28

## 2018-06-13 RX ADMIN — ENALAPRIL MALEATE SCH MG: 10 TABLET ORAL at 10:48

## 2018-06-13 RX ADMIN — ATORVASTATIN CALCIUM SCH MG: 10 TABLET, FILM COATED ORAL at 22:27

## 2018-06-13 RX ADMIN — METOPROLOL TARTRATE SCH MG: 50 TABLET, FILM COATED ORAL at 18:27

## 2018-06-13 RX ADMIN — HYDROCHLOROTHIAZIDE SCH MG: 25 TABLET ORAL at 10:48

## 2018-06-13 RX ADMIN — Medication SCH TAB: at 10:48

## 2018-06-13 RX ADMIN — Medication SCH MG: at 22:27

## 2018-06-13 RX ADMIN — ZOLPIDEM TARTRATE PRN MG: 10 TABLET, FILM COATED ORAL at 22:27

## 2018-06-13 RX ADMIN — METOPROLOL TARTRATE SCH MG: 50 TABLET, FILM COATED ORAL at 10:48

## 2018-06-13 NOTE — PN
[facility] CIWA





- CIWA Score


Nausea/Vomitin-No Nausea/No Vomiting


Muscle Tremors: 4-Moderate,w/Arms Extend


Anxiety: 4-Mod. Anxious/Guarded


Agitation: 4-Moderately Restless


Paroxysmal Sweats: 1-Minimal Palms Moist


Orientation: 0-Oriented


Tacttile Disturbances: 3-Moderate Itch/Numb/Burn


Auditory Disturbances: 0-None


Visual Disturbances: 0-None


Headache: 0-None Present


CIWA-Ar Total Score: 16





BHS Progress Note (SOAP)


Subjective: 





ANXIETY,SWEATS,IRRITABILITY, INSOMNIA HX-AMBIEN NOT EFFECTIVE. REQUESTS MED 

REVIEW WITH PSYCH MGARRETT


Objective: 





18 11:58


 Vital Signs











  18





  06:45 09:28


 


Temperature 98.7 F 98.5 F


 


Pulse Rate 83 89


 


Respiratory 20 18





Rate  


 


Blood Pressure 148/92 144/86








 Laboratory Tests











  18





  17:45 05:50 05:50


 


WBC   4.3 


 


RBC   5.38 


 


Hgb   12.9 


 


Hct   40.3 


 


MCV   74.8 L 


 


MCH   24.0 L 


 


MCHC   32.1 


 


RDW   14.7 


 


Plt Count   173 


 


MPV   10.4 


 


Sodium    140


 


Potassium    4.2


 


Chloride    106


 


Carbon Dioxide    24


 


Anion Gap    10


 


BUN    23 H D


 


Creatinine    1.2


 


Creat Clearance w eGFR    > 60


 


Random Glucose    127 H


 


Calcium    8.5


 


Total Bilirubin    0.3  D


 


AST    14 L D


 


ALT    27  D


 


Alkaline Phosphatase    38 L


 


Total Protein    7.6


 


Albumin    3.8


 


Urine Color  Yellow  


 


Urine Appearance  Clear  


 


Urine pH  5.0  


 


Ur Specific Gravity  1.029  


 


Urine Protein  1+ H  


 


Urine Glucose (UA)  Negative  


 


Urine Ketones  Negative  


 


Urine Blood  Negative  


 


Urine Nitrite  Negative  


 


Urine Bilirubin  Negative  


 


Urine Urobilinogen  Negative  


 


Ur Leukocyte Esterase  Negative  


 


Urine WBC (Auto)  11  


 


Urine RBC (Auto)  5  


 


Ur Epithelial Cells  Few  


 


Urine Bacteria  Rare  


 


Hyaline Casts  11  


 


Urine Mucus  Many  


 


RPR Titer   














  18





  05:50


 


WBC 


 


RBC 


 


Hgb 


 


Hct 


 


MCV 


 


MCH 


 


MCHC 


 


RDW 


 


Plt Count 


 


MPV 


 


Sodium 


 


Potassium 


 


Chloride 


 


Carbon Dioxide 


 


Anion Gap 


 


BUN 


 


Creatinine 


 


Creat Clearance w eGFR 


 


Random Glucose 


 


Calcium 


 


Total Bilirubin 


 


AST 


 


ALT 


 


Alkaline Phosphatase 


 


Total Protein 


 


Albumin 


 


Urine Color 


 


Urine Appearance 


 


Urine pH 


 


Ur Specific Gravity 


 


Urine Protein 


 


Urine Glucose (UA) 


 


Urine Ketones 


 


Urine Blood 


 


Urine Nitrite 


 


Urine Bilirubin 


 


Urine Urobilinogen 


 


Ur Leukocyte Esterase 


 


Urine WBC (Auto) 


 


Urine RBC (Auto) 


 


Ur Epithelial Cells 


 


Urine Bacteria 


 


Hyaline Casts 


 


Urine Mucus 


 


RPR Titer  Nonreactive











Assessment: 





18 11:58


WITHDRAWAL SX


Plan: 





CONTINUE DETOX


F/U WITH PSYCH MD TODAY.

## 2018-06-13 NOTE — PN
BHS Progress Note


Note: 





Psychiatry


Attending's note (follow-up) :


Contacted by medical NP Pam.


Issue : complaint of insomnia.


Ambien not effective as per referral note.


Approached patient at bedside today (around 3 pm).


Mr Tom is found awake,resting.Patient declines interview.


" I am fine.I don't have anything to discuss with psychiatrists." 


End of encounter.No intervention.

## 2018-06-14 RX ADMIN — ZOLPIDEM TARTRATE PRN MG: 10 TABLET, FILM COATED ORAL at 21:55

## 2018-06-14 RX ADMIN — ENALAPRIL MALEATE SCH MG: 10 TABLET ORAL at 17:10

## 2018-06-14 RX ADMIN — HYDROCHLOROTHIAZIDE SCH MG: 25 TABLET ORAL at 10:36

## 2018-06-14 RX ADMIN — Medication SCH MG: at 22:28

## 2018-06-14 RX ADMIN — ENALAPRIL MALEATE SCH MG: 10 TABLET ORAL at 10:37

## 2018-06-14 RX ADMIN — Medication SCH TAB: at 10:37

## 2018-06-14 RX ADMIN — METOPROLOL TARTRATE SCH MG: 50 TABLET, FILM COATED ORAL at 10:37

## 2018-06-14 RX ADMIN — ATORVASTATIN CALCIUM SCH MG: 10 TABLET, FILM COATED ORAL at 22:28

## 2018-06-14 RX ADMIN — METOPROLOL TARTRATE SCH MG: 50 TABLET, FILM COATED ORAL at 17:10

## 2018-06-14 NOTE — PN
BHS Progress Note (SOAP)


Subjective: 





ALERT O X 3. OOB AMBULATING WITH STEADY GAIT. C/O INSOMNIA. WAS SEEN YESTERDAY 

BY PSYCH MD RE:INSOMNIA.


Objective: 





06/14/18 11:39


 Vital Signs











  06/14/18





  09:26


 


Temperature 97.8 F


 


Pulse Rate 92 H


 


Respiratory 20





Rate 


 


Blood Pressure 154/94








 Laboratory Tests











  06/11/18 06/12/18 06/12/18





  17:45 05:50 05:50


 


WBC   4.3 


 


RBC   5.38 


 


Hgb   12.9 


 


Hct   40.3 


 


MCV   74.8 L 


 


MCH   24.0 L 


 


MCHC   32.1 


 


RDW   14.7 


 


Plt Count   173 


 


MPV   10.4 


 


Sodium    140


 


Potassium    4.2


 


Chloride    106


 


Carbon Dioxide    24


 


Anion Gap    10


 


BUN    23 H D


 


Creatinine    1.2


 


Creat Clearance w eGFR    > 60


 


Random Glucose    127 H


 


Calcium    8.5


 


Total Bilirubin    0.3  D


 


AST    14 L D


 


ALT    27  D


 


Alkaline Phosphatase    38 L


 


Total Protein    7.6


 


Albumin    3.8


 


Urine Color  Yellow  


 


Urine Appearance  Clear  


 


Urine pH  5.0  


 


Ur Specific Gravity  1.029  


 


Urine Protein  1+ H  


 


Urine Glucose (UA)  Negative  


 


Urine Ketones  Negative  


 


Urine Blood  Negative  


 


Urine Nitrite  Negative  


 


Urine Bilirubin  Negative  


 


Urine Urobilinogen  Negative  


 


Ur Leukocyte Esterase  Negative  


 


Urine WBC (Auto)  11  


 


Urine RBC (Auto)  5  


 


Ur Epithelial Cells  Few  


 


Urine Bacteria  Rare  


 


Hyaline Casts  11  


 


Urine Mucus  Many  


 


RPR Titer   














  06/12/18





  05:50


 


WBC 


 


RBC 


 


Hgb 


 


Hct 


 


MCV 


 


MCH 


 


MCHC 


 


RDW 


 


Plt Count 


 


MPV 


 


Sodium 


 


Potassium 


 


Chloride 


 


Carbon Dioxide 


 


Anion Gap 


 


BUN 


 


Creatinine 


 


Creat Clearance w eGFR 


 


Random Glucose 


 


Calcium 


 


Total Bilirubin 


 


AST 


 


ALT 


 


Alkaline Phosphatase 


 


Total Protein 


 


Albumin 


 


Urine Color 


 


Urine Appearance 


 


Urine pH 


 


Ur Specific Gravity 


 


Urine Protein 


 


Urine Glucose (UA) 


 


Urine Ketones 


 


Urine Blood 


 


Urine Nitrite 


 


Urine Bilirubin 


 


Urine Urobilinogen 


 


Ur Leukocyte Esterase 


 


Urine WBC (Auto) 


 


Urine RBC (Auto) 


 


Ur Epithelial Cells 


 


Urine Bacteria 


 


Hyaline Casts 


 


Urine Mucus 


 


RPR Titer  Nonreactive











Assessment: 





06/14/18 11:39


WITHDRAWAL SX


Plan: 





CONTINUE DETOX

## 2018-06-15 VITALS — HEART RATE: 65 BPM | TEMPERATURE: 98.4 F | DIASTOLIC BLOOD PRESSURE: 83 MMHG | SYSTOLIC BLOOD PRESSURE: 145 MMHG

## 2018-06-15 NOTE — PN
BHS Progress Note (SOAP)


Subjective: 





Patient denies current Detox symptoms and reports that he feels well overall.


Objective: 


PATIENT A & O X 3, OBSERVED AMBULATING ON UNIT. NO ACUTE DISTRESS.





06/15/18 13:38


 Vital Signs











Temperature  98.4 F   06/15/18 06:17


 


Pulse Rate  65   06/15/18 06:17


 


Respiratory Rate  20   06/15/18 06:17


 


Blood Pressure  145/83   06/15/18 06:17


 


O2 Sat by Pulse Oximetry (%)      








 Laboratory Tests











  06/11/18 06/12/18 06/12/18





  17:45 05:50 05:50


 


WBC   4.3 


 


RBC   5.38 


 


Hgb   12.9 


 


Hct   40.3 


 


MCV   74.8 L 


 


MCH   24.0 L 


 


MCHC   32.1 


 


RDW   14.7 


 


Plt Count   173 


 


MPV   10.4 


 


Sodium    140


 


Potassium    4.2


 


Chloride    106


 


Carbon Dioxide    24


 


Anion Gap    10


 


BUN    23 H D


 


Creatinine    1.2


 


Creat Clearance w eGFR    > 60


 


Random Glucose    127 H


 


Calcium    8.5


 


Total Bilirubin    0.3  D


 


AST    14 L D


 


ALT    27  D


 


Alkaline Phosphatase    38 L


 


Total Protein    7.6


 


Albumin    3.8


 


Urine Color  Yellow  


 


Urine Appearance  Clear  


 


Urine pH  5.0  


 


Ur Specific Gravity  1.029  


 


Urine Protein  1+ H  


 


Urine Glucose (UA)  Negative  


 


Urine Ketones  Negative  


 


Urine Blood  Negative  


 


Urine Nitrite  Negative  


 


Urine Bilirubin  Negative  


 


Urine Urobilinogen  Negative  


 


Ur Leukocyte Esterase  Negative  


 


Urine WBC (Auto)  11  


 


Urine RBC (Auto)  5  


 


Ur Epithelial Cells  Few  


 


Urine Bacteria  Rare  


 


Hyaline Casts  11  


 


Urine Mucus  Many  


 


RPR Titer   














  06/12/18





  05:50


 


WBC 


 


RBC 


 


Hgb 


 


Hct 


 


MCV 


 


MCH 


 


MCHC 


 


RDW 


 


Plt Count 


 


MPV 


 


Sodium 


 


Potassium 


 


Chloride 


 


Carbon Dioxide 


 


Anion Gap 


 


BUN 


 


Creatinine 


 


Creat Clearance w eGFR 


 


Random Glucose 


 


Calcium 


 


Total Bilirubin 


 


AST 


 


ALT 


 


Alkaline Phosphatase 


 


Total Protein 


 


Albumin 


 


Urine Color 


 


Urine Appearance 


 


Urine pH 


 


Ur Specific Gravity 


 


Urine Protein 


 


Urine Glucose (UA) 


 


Urine Ketones 


 


Urine Blood 


 


Urine Nitrite 


 


Urine Bilirubin 


 


Urine Urobilinogen 


 


Ur Leukocyte Esterase 


 


Urine WBC (Auto) 


 


Urine RBC (Auto) 


 


Ur Epithelial Cells 


 


Urine Bacteria 


 


Hyaline Casts 


 


Urine Mucus 


 


RPR Titer  Nonreactive








LABS NOTED.


Assessment: 





06/15/18 13:38


COMPLETION OF DETOX REGIMEN


Plan: 





PATIENT SCHEDULED FOR DISCHARGE FROM DETOX UNIT TODAY.

## 2018-06-15 NOTE — DS
BHS Detox Discharge Summary


Admission Date: 


06/11/18





Discharge Date: 06/15/18





- History


Present History: Alcohol Dependence, Cocaine Dependence


Additional Comments: 





PATIENT ADVISED TO CONSIDER LOCAL 12-STEP/[facility] OUTPATIENT SUPPORT GROUPS FOR 

AFTERCARE. PATIENT WAS DISCHARGED FROM DETOX UNIT IN STABLE MEDICAL CONDITION.


Pertinent Past History: 





HTN, History of Lipoma, Insomnia, Nicotine Dependence.





- Physical Exam Results


Vital Signs: 


 Vital Signs











Temperature  98.4 F   06/15/18 06:17


 


Pulse Rate  65   06/15/18 06:17


 


Respiratory Rate  20   06/15/18 06:17


 


Blood Pressure  145/83   06/15/18 06:17


 


O2 Sat by Pulse Oximetry (%)      











Pertinent Admission Physical Exam Findings: 





WITHDRAWAL SYMPTOMS.





 Laboratory Tests











  06/11/18 06/12/18 06/12/18





  17:45 05:50 05:50


 


WBC   4.3 


 


RBC   5.38 


 


Hgb   12.9 


 


Hct   40.3 


 


MCV   74.8 L 


 


MCH   24.0 L 


 


MCHC   32.1 


 


RDW   14.7 


 


Plt Count   173 


 


MPV   10.4 


 


Sodium    140


 


Potassium    4.2


 


Chloride    106


 


Carbon Dioxide    24


 


Anion Gap    10


 


BUN    23 H D


 


Creatinine    1.2


 


Creat Clearance w eGFR    > 60


 


Random Glucose    127 H


 


Calcium    8.5


 


Total Bilirubin    0.3  D


 


AST    14 L D


 


ALT    27  D


 


Alkaline Phosphatase    38 L


 


Total Protein    7.6


 


Albumin    3.8


 


Urine Color  Yellow  


 


Urine Appearance  Clear  


 


Urine pH  5.0  


 


Ur Specific Gravity  1.029  


 


Urine Protein  1+ H  


 


Urine Glucose (UA)  Negative  


 


Urine Ketones  Negative  


 


Urine Blood  Negative  


 


Urine Nitrite  Negative  


 


Urine Bilirubin  Negative  


 


Urine Urobilinogen  Negative  


 


Ur Leukocyte Esterase  Negative  


 


Urine WBC (Auto)  11  


 


Urine RBC (Auto)  5  


 


Ur Epithelial Cells  Few  


 


Urine Bacteria  Rare  


 


Hyaline Casts  11  


 


Urine Mucus  Many  


 


RPR Titer   














  06/12/18





  05:50


 


WBC 


 


RBC 


 


Hgb 


 


Hct 


 


MCV 


 


MCH 


 


MCHC 


 


RDW 


 


Plt Count 


 


MPV 


 


Sodium 


 


Potassium 


 


Chloride 


 


Carbon Dioxide 


 


Anion Gap 


 


BUN 


 


Creatinine 


 


Creat Clearance w eGFR 


 


Random Glucose 


 


Calcium 


 


Total Bilirubin 


 


AST 


 


ALT 


 


Alkaline Phosphatase 


 


Total Protein 


 


Albumin 


 


Urine Color 


 


Urine Appearance 


 


Urine pH 


 


Ur Specific Gravity 


 


Urine Protein 


 


Urine Glucose (UA) 


 


Urine Ketones 


 


Urine Blood 


 


Urine Nitrite 


 


Urine Bilirubin 


 


Urine Urobilinogen 


 


Ur Leukocyte Esterase 


 


Urine WBC (Auto) 


 


Urine RBC (Auto) 


 


Ur Epithelial Cells 


 


Urine Bacteria 


 


Hyaline Casts 


 


Urine Mucus 


 


RPR Titer  Nonreactive








LABS NOTED.





- Treatment


Hospital Course: Detox Protocol Followed, Detoxed Safely, Responded well, 

Discharged Condition Good


Patient has Accepted a Rehab Referral to: PT. ADIVSED TO CONSIDER LOCAL 12-STEP/

[facility] OUTPATIENT SUPPORT GROUPS.





- Medication


Discharge Medications: 


Ambulatory Orders





Folic Acid - 1 mg PO DAILY 06/11/18 


Mirtazapine 7.5 mg PO HS 06/11/18 


Multivitamin [Daily Multiple Vitamin] 1 each PO DAILY 06/11/18 


Enalapril Maleate [Vasotec -] 20 mg PO BID@1000,1800 #60 tablet 06/15/18 


Hydrochlorothiazide [Hctz -] 25 mg PO DAILY #30 tablet 06/15/18 


Metoprolol Tartrate [Lopressor -] 50 mg PO BID@1000,1800 #60 tablet 06/15/18 











- Diagnosis


(1) Alcohol dependence with uncomplicated withdrawal


Status: Acute   





(2) Cocaine dependence, uncomplicated


Status: Acute   





(3) Insomnia


Status: Acute   


Qualifiers: 


   Insomnia type: unspecified   Qualified Code(s): G47.00 - Insomnia, 

unspecified   





(4) Lipoma


Status: Acute   


Qualifiers: 


   Lipoma location: head   Qualified Code(s): D17.0 - Benign lipomatous 

neoplasm of skin and subcutaneous tissue of head, face and neck   





(5) Nicotine dependence


Status: Acute   


Qualifiers: 


   Nicotine product type: cigarettes   Substance use status: in withdrawal   

Qualified Code(s): F17.213 - Nicotine dependence, cigarettes, with withdrawal   





(6) Hypertension


Status: Chronic   


Qualifiers: 


   Hypertension type: essential hypertension   Qualified Code(s): I10 - 

Essential (primary) hypertension   





- AMA


Did Patient Leave Against Medical Advice: No

## 2022-02-14 ENCOUNTER — HOSPITAL ENCOUNTER (INPATIENT)
Dept: HOSPITAL 74 - YASAS | Age: 57
LOS: 5 days | Discharge: HOME | End: 2022-02-19
Attending: ALLERGY & IMMUNOLOGY | Admitting: ALLERGY & IMMUNOLOGY
Payer: COMMERCIAL

## 2022-02-14 VITALS — BODY MASS INDEX: 34.5 KG/M2

## 2022-02-14 DIAGNOSIS — F14.20: ICD-10-CM

## 2022-02-14 DIAGNOSIS — F19.282: ICD-10-CM

## 2022-02-14 DIAGNOSIS — D17.0: ICD-10-CM

## 2022-02-14 DIAGNOSIS — F10.230: Primary | ICD-10-CM

## 2022-02-14 DIAGNOSIS — Z56.0: ICD-10-CM

## 2022-02-14 DIAGNOSIS — I10: ICD-10-CM

## 2022-02-14 DIAGNOSIS — F19.24: ICD-10-CM

## 2022-02-14 DIAGNOSIS — E78.00: ICD-10-CM

## 2022-02-14 DIAGNOSIS — F17.210: ICD-10-CM

## 2022-02-14 LAB
ALBUMIN SERPL-MCNC: 3.7 G/DL (ref 3.4–5)
ALP SERPL-CCNC: 32 U/L (ref 45–117)
ALT SERPL-CCNC: 20 U/L (ref 13–61)
ANION GAP SERPL CALC-SCNC: 5 MMOL/L (ref 8–16)
AST SERPL-CCNC: 12 U/L (ref 15–37)
BILIRUB SERPL-MCNC: 0.6 MG/DL (ref 0.2–1)
BUN SERPL-MCNC: 19.7 MG/DL (ref 7–18)
CALCIUM SERPL-MCNC: 9.8 MG/DL (ref 8.5–10.1)
CHLORIDE SERPL-SCNC: 108 MMOL/L (ref 98–107)
CO2 SERPL-SCNC: 28 MMOL/L (ref 21–32)
CREAT SERPL-MCNC: 1.1 MG/DL (ref 0.55–1.3)
DEPRECATED RDW RBC AUTO: 15.9 % (ref 11.9–15.9)
GLUCOSE SERPL-MCNC: 100 MG/DL (ref 74–106)
HCT VFR BLD CALC: 41.2 % (ref 35.4–49)
HGB BLD-MCNC: 13.1 GM/DL (ref 11.7–16.9)
MCH RBC QN AUTO: 23.7 PG (ref 25.7–33.7)
MCHC RBC AUTO-ENTMCNC: 31.9 G/DL (ref 32–35.9)
MCV RBC: 74.4 FL (ref 80–96)
PLATELET # BLD AUTO: 198 10^3/UL (ref 134–434)
PMV BLD: 9 FL (ref 7.5–11.1)
PROT SERPL-MCNC: 7.3 G/DL (ref 6.4–8.2)
RBC # BLD AUTO: 5.55 M/MM3 (ref 4–5.6)
SODIUM SERPL-SCNC: 140 MMOL/L (ref 136–145)
WBC # BLD AUTO: 4.1 K/MM3 (ref 4–10)

## 2022-02-14 PROCEDURE — HZ2ZZZZ DETOXIFICATION SERVICES FOR SUBSTANCE ABUSE TREATMENT: ICD-10-PCS | Performed by: ALLERGY & IMMUNOLOGY

## 2022-02-14 PROCEDURE — U0005 INFEC AGEN DETEC AMPLI PROBE: HCPCS

## 2022-02-14 PROCEDURE — C9803 HOPD COVID-19 SPEC COLLECT: HCPCS

## 2022-02-14 PROCEDURE — U0003 INFECTIOUS AGENT DETECTION BY NUCLEIC ACID (DNA OR RNA); SEVERE ACUTE RESPIRATORY SYNDROME CORONAVIRUS 2 (SARS-COV-2) (CORONAVIRUS DISEASE [COVID-19]), AMPLIFIED PROBE TECHNIQUE, MAKING USE OF HIGH THROUGHPUT TECHNOLOGIES AS DESCRIBED BY CMS-2020-01-R: HCPCS

## 2022-02-14 RX ADMIN — Medication SCH MG: at 22:07

## 2022-02-14 RX ADMIN — HYDROXYZINE PAMOATE SCH MG: 25 CAPSULE ORAL at 13:30

## 2022-02-14 RX ADMIN — Medication SCH TAB: at 13:30

## 2022-02-14 RX ADMIN — HYDROXYZINE PAMOATE SCH MG: 25 CAPSULE ORAL at 17:31

## 2022-02-14 RX ADMIN — METOPROLOL TARTRATE SCH MG: 50 TABLET, FILM COATED ORAL at 17:31

## 2022-02-14 RX ADMIN — METHOCARBAMOL PRN MG: 500 TABLET ORAL at 22:06

## 2022-02-14 RX ADMIN — ENALAPRIL MALEATE SCH MG: 10 TABLET ORAL at 17:31

## 2022-02-14 RX ADMIN — HYDROXYZINE PAMOATE SCH MG: 25 CAPSULE ORAL at 22:07

## 2022-02-14 SDOH — ECONOMIC STABILITY - INCOME SECURITY: UNEMPLOYMENT, UNSPECIFIED: Z56.0

## 2022-02-15 RX ADMIN — HYDROXYZINE PAMOATE SCH MG: 25 CAPSULE ORAL at 18:38

## 2022-02-15 RX ADMIN — HYDROXYZINE PAMOATE SCH: 25 CAPSULE ORAL at 14:40

## 2022-02-15 RX ADMIN — METHOCARBAMOL PRN MG: 500 TABLET ORAL at 23:14

## 2022-02-15 RX ADMIN — ENALAPRIL MALEATE SCH MG: 10 TABLET ORAL at 11:22

## 2022-02-15 RX ADMIN — Medication SCH MG: at 22:36

## 2022-02-15 RX ADMIN — METOPROLOL TARTRATE SCH MG: 50 TABLET, FILM COATED ORAL at 18:38

## 2022-02-15 RX ADMIN — METOPROLOL TARTRATE SCH MG: 50 TABLET, FILM COATED ORAL at 11:23

## 2022-02-15 RX ADMIN — HYDROXYZINE PAMOATE SCH MG: 25 CAPSULE ORAL at 11:23

## 2022-02-15 RX ADMIN — HYDROXYZINE PAMOATE SCH MG: 25 CAPSULE ORAL at 06:18

## 2022-02-15 RX ADMIN — Medication SCH TAB: at 11:23

## 2022-02-15 RX ADMIN — HYDROCHLOROTHIAZIDE SCH MG: 25 TABLET ORAL at 11:23

## 2022-02-15 RX ADMIN — ENALAPRIL MALEATE SCH MG: 10 TABLET ORAL at 18:38

## 2022-02-15 RX ADMIN — HYDROXYZINE PAMOATE SCH MG: 25 CAPSULE ORAL at 22:36

## 2022-02-16 RX ADMIN — Medication SCH MG: at 22:21

## 2022-02-16 RX ADMIN — HYDROCHLOROTHIAZIDE SCH MG: 25 TABLET ORAL at 11:15

## 2022-02-16 RX ADMIN — ACETAMINOPHEN PRN MG: 325 TABLET ORAL at 11:15

## 2022-02-16 RX ADMIN — HYDROXYZINE PAMOATE SCH MG: 25 CAPSULE ORAL at 11:14

## 2022-02-16 RX ADMIN — ENALAPRIL MALEATE SCH MG: 10 TABLET ORAL at 18:16

## 2022-02-16 RX ADMIN — METOPROLOL TARTRATE SCH MG: 50 TABLET, FILM COATED ORAL at 18:16

## 2022-02-16 RX ADMIN — HYDROXYZINE PAMOATE SCH: 25 CAPSULE ORAL at 14:10

## 2022-02-16 RX ADMIN — HYDROXYZINE PAMOATE SCH MG: 25 CAPSULE ORAL at 18:17

## 2022-02-16 RX ADMIN — ENALAPRIL MALEATE SCH MG: 10 TABLET ORAL at 11:14

## 2022-02-16 RX ADMIN — HYDROXYZINE PAMOATE SCH: 25 CAPSULE ORAL at 22:23

## 2022-02-16 RX ADMIN — METOPROLOL TARTRATE SCH MG: 50 TABLET, FILM COATED ORAL at 11:15

## 2022-02-16 RX ADMIN — Medication SCH TAB: at 11:14

## 2022-02-16 RX ADMIN — HYDROXYZINE PAMOATE SCH MG: 25 CAPSULE ORAL at 06:35

## 2022-02-17 RX ADMIN — ACETAMINOPHEN PRN MG: 325 TABLET ORAL at 10:05

## 2022-02-17 RX ADMIN — Medication SCH MG: at 22:38

## 2022-02-17 RX ADMIN — ACETAMINOPHEN PRN MG: 325 TABLET ORAL at 22:40

## 2022-02-17 RX ADMIN — METOPROLOL TARTRATE SCH MG: 50 TABLET, FILM COATED ORAL at 10:04

## 2022-02-17 RX ADMIN — HYDROXYZINE PAMOATE SCH MG: 25 CAPSULE ORAL at 06:00

## 2022-02-17 RX ADMIN — METHOCARBAMOL PRN MG: 500 TABLET ORAL at 10:04

## 2022-02-17 RX ADMIN — HYDROXYZINE PAMOATE SCH: 25 CAPSULE ORAL at 14:55

## 2022-02-17 RX ADMIN — HYDROCHLOROTHIAZIDE SCH MG: 25 TABLET ORAL at 10:04

## 2022-02-17 RX ADMIN — AMLODIPINE BESYLATE SCH MG: 5 TABLET ORAL at 12:10

## 2022-02-17 RX ADMIN — ENALAPRIL MALEATE SCH MG: 10 TABLET ORAL at 10:04

## 2022-02-17 RX ADMIN — METOPROLOL TARTRATE SCH MG: 50 TABLET, FILM COATED ORAL at 18:43

## 2022-02-17 RX ADMIN — ENALAPRIL MALEATE SCH MG: 10 TABLET ORAL at 18:42

## 2022-02-17 RX ADMIN — HYDROXYZINE PAMOATE SCH MG: 25 CAPSULE ORAL at 10:04

## 2022-02-17 RX ADMIN — HYDROXYZINE PAMOATE SCH MG: 25 CAPSULE ORAL at 22:38

## 2022-02-17 RX ADMIN — Medication SCH TAB: at 10:04

## 2022-02-17 RX ADMIN — HYDROXYZINE PAMOATE SCH MG: 25 CAPSULE ORAL at 18:42

## 2022-02-18 RX ADMIN — HYDROXYZINE PAMOATE SCH MG: 25 CAPSULE ORAL at 18:12

## 2022-02-18 RX ADMIN — Medication SCH MG: at 22:42

## 2022-02-18 RX ADMIN — HYDROXYZINE PAMOATE SCH MG: 25 CAPSULE ORAL at 10:18

## 2022-02-18 RX ADMIN — Medication SCH TAB: at 10:18

## 2022-02-18 RX ADMIN — METHOCARBAMOL PRN MG: 500 TABLET ORAL at 22:42

## 2022-02-18 RX ADMIN — METOPROLOL TARTRATE SCH MG: 50 TABLET, FILM COATED ORAL at 10:18

## 2022-02-18 RX ADMIN — HYDROXYZINE PAMOATE SCH: 25 CAPSULE ORAL at 13:20

## 2022-02-18 RX ADMIN — METHOCARBAMOL PRN MG: 500 TABLET ORAL at 10:18

## 2022-02-18 RX ADMIN — AMLODIPINE BESYLATE SCH MG: 5 TABLET ORAL at 10:18

## 2022-02-18 RX ADMIN — HYDROXYZINE PAMOATE SCH MG: 25 CAPSULE ORAL at 22:42

## 2022-02-18 RX ADMIN — ENALAPRIL MALEATE SCH MG: 10 TABLET ORAL at 10:18

## 2022-02-18 RX ADMIN — HYDROCHLOROTHIAZIDE SCH MG: 25 TABLET ORAL at 10:18

## 2022-02-18 RX ADMIN — HYDROXYZINE PAMOATE SCH MG: 25 CAPSULE ORAL at 05:42

## 2022-02-18 RX ADMIN — ENALAPRIL MALEATE SCH MG: 10 TABLET ORAL at 18:11

## 2022-02-18 RX ADMIN — METOPROLOL TARTRATE SCH MG: 50 TABLET, FILM COATED ORAL at 18:12

## 2022-02-19 VITALS — TEMPERATURE: 97.2 F | SYSTOLIC BLOOD PRESSURE: 153 MMHG | HEART RATE: 93 BPM | DIASTOLIC BLOOD PRESSURE: 111 MMHG

## 2022-02-19 RX ADMIN — HYDROXYZINE PAMOATE SCH: 25 CAPSULE ORAL at 06:44

## 2022-02-19 RX ADMIN — AMLODIPINE BESYLATE SCH MG: 5 TABLET ORAL at 10:54

## 2022-02-19 RX ADMIN — HYDROXYZINE PAMOATE SCH MG: 25 CAPSULE ORAL at 10:54

## 2022-02-19 RX ADMIN — Medication SCH TAB: at 10:54

## 2022-02-19 RX ADMIN — ENALAPRIL MALEATE SCH MG: 10 TABLET ORAL at 10:53

## 2022-02-19 RX ADMIN — HYDROCHLOROTHIAZIDE SCH MG: 25 TABLET ORAL at 10:54

## 2022-02-19 RX ADMIN — METOPROLOL TARTRATE SCH MG: 50 TABLET, FILM COATED ORAL at 10:53
